# Patient Record
Sex: MALE | Race: WHITE | Employment: FULL TIME | URBAN - METROPOLITAN AREA
[De-identification: names, ages, dates, MRNs, and addresses within clinical notes are randomized per-mention and may not be internally consistent; named-entity substitution may affect disease eponyms.]

---

## 2018-10-01 NOTE — PROGRESS NOTES
"  SUBJECTIVE:                                                    Justin Kent is a 43 year old male who presents to clinic today for the following health issues:    WART(S)  Onset: 2-3 months    Description:   Location: left foot   Number of warts: 1  Painful: no     Accompanying Signs & Symptoms:  Signs of infection: no     History:   History of trauma: no   Prior warts: YES- when 9     Therapies Tried and outcome: liquid nitrogen,oregano oil      Problem list and histories reviewed & adjusted, as indicated.  Additional history: as documented    Patient Active Problem List   Diagnosis     Dyshidrosis     Past Surgical History:   Procedure Laterality Date     C LASIK (EMP) W OPT MYOPIA -3.12  2001       Social History   Substance Use Topics     Smoking status: Never Smoker     Smokeless tobacco: Never Used     Alcohol use Yes      Comment: 2 drinks per day     Family History   Problem Relation Age of Onset     Prostate Cancer Father      C.A.D. Maternal Grandfather      Hypertension Maternal Grandfather      Asthma No family hx of      Diabetes No family hx of      Cerebrovascular Disease No family hx of      Breast Cancer No family hx of      Cancer - colorectal No family hx of      Anesthesia Reaction No family hx of      Blood Disease No family hx of      Eye Disorder No family hx of      Osteoporosis No family hx of      Thyroid Disease No family hx of          No current outpatient prescriptions on file.     No Known Allergies    ROS:  Constitutional, HEENT, cardiovascular, pulmonary, GI, , musculoskeletal, neuro, skin, endocrine and psych systems are negative, except as otherwise noted.    OBJECTIVE:     /88  Pulse 70  Temp 98  F (36.7  C) (Oral)  Resp 14  Ht 5' 8\" (1.727 m)  Wt 197 lb (89.4 kg)  SpO2 98%  BMI 29.95 kg/m2  Body mass index is 29.95 kg/(m^2).  GENERAL: healthy, alert and no distress  SKIN: left foot: 1 plantar wart        ASSESSMENT/PLAN:       1. Plantar wart of left " foot  PLAN:    Each wart was frozen easily three times with liquid nitrogen.  A total of 1 wart was treated today.    The etiology of common warts were discussed.    The patient will continue to use the over-the-counter medications such as Compound W on a nightly basis.  Warm soapy water soaks and sanding also recommended.    The patient is to return every two weeks until all warts have resolved.    - DESTRUCT BENIGN LESION, UP TO 14      Kristen M. Kehr, PA-C  [facility]

## 2018-10-02 ENCOUNTER — OFFICE VISIT (OUTPATIENT)
Dept: FAMILY MEDICINE | Facility: CLINIC | Age: 43
End: 2018-10-02
Payer: COMMERCIAL

## 2018-10-02 VITALS
DIASTOLIC BLOOD PRESSURE: 88 MMHG | WEIGHT: 197 LBS | SYSTOLIC BLOOD PRESSURE: 134 MMHG | BODY MASS INDEX: 29.86 KG/M2 | OXYGEN SATURATION: 98 % | HEART RATE: 70 BPM | HEIGHT: 68 IN | RESPIRATION RATE: 14 BRPM | TEMPERATURE: 98 F

## 2018-10-02 DIAGNOSIS — B07.0 PLANTAR WART OF LEFT FOOT: Primary | ICD-10-CM

## 2018-10-02 PROCEDURE — 17110 DESTRUCTION B9 LES UP TO 14: CPT | Performed by: PHYSICIAN ASSISTANT

## 2018-10-02 PROCEDURE — 99207 ZZC NO CHARGE LOS: CPT | Performed by: PHYSICIAN ASSISTANT

## 2018-10-02 NOTE — MR AVS SNAPSHOT
"              After Visit Summary   10/2/2018    Justin Kent    MRN: [id number]           Patient Information     Date Of Birth          1975        Visit Information        Provider Department      10/2/2018 11:00 AM Kehr, Kristen M, PA-C [facility]        Today's Diagnoses     Screening for HIV (human immunodeficiency virus)        Need for prophylactic vaccination and inoculation against influenza        Need for prophylactic vaccination with tetanus-diphtheria (Td)           Follow-ups after your visit        Follow-up notes from your care team     Return in about 2 weeks (around 10/16/2018) for FOR WART TREATMENT IF NEEDED.      Who to contact     If you have questions or need follow up information about today's clinic visit or your schedule please contact [facility] directly at [phone].  Normal or non-critical lab and imaging results will be communicated to you by MyChart, letter or phone within 4 business days after the clinic has received the results. If you do not hear from us within 7 days, please contact the clinic through MyChart or phone. If you have a critical or abnormal lab result, we will notify you by phone as soon as possible.  Submit refill requests through [employer] or call your pharmacy and they will forward the refill request to us. Please allow 3 business days for your refill to be completed.          Additional Information About Your Visit        Care EveryWhere ID     This is your Care EveryWhere ID. This could be used by other organizations to access your [place] medical records  [id number]        Your Vitals Were     Pulse Temperature Respirations Height Pulse Oximetry BMI (Body Mass Index)    70 98  F (36.7  C) (Oral) 14 5' 8\" (1.727 m) 98% 29.95 kg/m2       Blood Pressure from Last 3 Encounters:   10/02/18 134/88   05/06/13 (!) 120/95   04/12/13 137/90    Weight from Last 3 Encounters:   10/02/18 197 lb (89.4 kg)   05/06/13 210 lb (95.3 kg) "   04/12/13 211 lb (95.7 kg)              Today, you had the following     No orders found for display       Primary Care Provider Office Phone # Fax #    [facility] [phone] [id number] [facility] [address]        Equal Access to Services     MEDHAT WOOD : Hadii aad ku hadasho Soomaali, waaxda luqadaha, qaybta kaalmada adeegyada, waxay mattin hayaan jenna constantinoarelibernie laernestine . So [facility] [phone].    ATENCIÓN: Si habla español, tiene a bartholomew disposición servicios gratuitos de asistencia lingüística. Yuliana al [phone].    We comply with applicable federal civil rights laws and [place] laws. We do not discriminate on the basis of race, color, national origin, age, disability, sex, sexual orientation, or gender identity.            Thank you!     Thank you for choosing [facility]  for your care. Our goal is always to provide you with excellent care. Hearing back from our patients is one way we can continue to improve our services. Please take a few minutes to complete the written survey that you may receive in the mail after your visit with us. Thank you!             Your Updated Medication List - Protect others around you: Learn how to safely use, store and throw away your medicines at www.disposemymeds.org.      Notice  As of 10/2/2018 11:42 AM    You have not been prescribed any medications.

## 2018-11-20 ENCOUNTER — OFFICE VISIT (OUTPATIENT)
Dept: FAMILY MEDICINE | Facility: CLINIC | Age: 43
End: 2018-11-20
Payer: COMMERCIAL

## 2018-11-20 VITALS
RESPIRATION RATE: 14 BRPM | OXYGEN SATURATION: 98 % | WEIGHT: 197 LBS | BODY MASS INDEX: 29.86 KG/M2 | SYSTOLIC BLOOD PRESSURE: 132 MMHG | HEART RATE: 59 BPM | HEIGHT: 68 IN | DIASTOLIC BLOOD PRESSURE: 86 MMHG | TEMPERATURE: 98.2 F

## 2018-11-20 DIAGNOSIS — B07.0 PLANTAR WARTS: Primary | ICD-10-CM

## 2018-11-20 PROCEDURE — 17110 DESTRUCTION B9 LES UP TO 14: CPT | Performed by: PHYSICIAN ASSISTANT

## 2018-11-20 NOTE — MR AVS SNAPSHOT
"              After Visit Summary   11/20/2018    Justin Kent    MRN: [id number]           Patient Information     Date Of Birth          1975        Visit Information        Provider Department      11/20/2018 8:40 AM Felix Saldaña PA-C [facility]        Today's Diagnoses     Plantar warts    -  1       Follow-ups after your visit        Who to contact     If you have questions or need follow up information about today's clinic visit or your schedule please contact [facility] directly at [phone].  Normal or non-critical lab and imaging results will be communicated to you by MyChart, letter or phone within 4 business days after the clinic has received the results. If you do not hear from us within 7 days, please contact the clinic through MyChart or phone. If you have a critical or abnormal lab result, we will notify you by phone as soon as possible.  Submit refill requests through [employer] or call your pharmacy and they will forward the refill request to us. Please allow 3 business days for your refill to be completed.          Additional Information About Your Visit        Care EveryWhere ID     This is your Care EveryWhere ID. This could be used by other organizations to access your [place] medical records  [id number]        Your Vitals Were     Pulse Temperature Respirations Height Pulse Oximetry BMI (Body Mass Index)    59 98.2  F (36.8  C) (Oral) 14 5' 8\" (1.727 m) 98% 29.95 kg/m2       Blood Pressure from Last 3 Encounters:   11/20/18 132/86   10/02/18 134/88   05/06/13 (!) 120/95    Weight from Last 3 Encounters:   11/20/18 197 lb (89.4 kg)   10/02/18 197 lb (89.4 kg)   05/06/13 210 lb (95.3 kg)              We Performed the Following     DESTRUCT BENIGN LESION, UP TO 14        Primary Care Provider Office Phone # Fax #    [facility] [phone] [address] [facility] [address]        Equal Access to Services     " MEDHAT WOOD : Hadii aad krista mercedes Castillo, warjda luqadaha, qaybta kaalmada jennamacielbrigid, eder constantinoarelibernie spencer. So [facility] [phone].    ATENCIÓN: Si habla español, tiene a bartholomew disposición servicios gratuitos de asistencia lingüística. Llame al [phone].    We comply with applicable federal civil rights laws and [place] laws. We do not discriminate on the basis of race, color, national origin, age, disability, sex, sexual orientation, or gender identity.            Thank you!     [facility] you [facility]  [facility]. Our goal is always to provide you with excellent care. Hearing back from our patients is one way we can continue to improve our services. Please take a few minutes to complete the written survey that you may receive in the mail after your visit with us. Thank you!             Your Updated Medication List - Protect others around you: Learn how to safely use, store and throw away your medicines at www.disposemymeds.org.      Notice  As of 11/20/2018  9:36 AM    You have not been prescribed any medications.

## 2018-11-20 NOTE — PROGRESS NOTES
"SUBJECTIVE:  Justin is here for follow up of warts which were treated with liquid nitrogen (3 cycles with full thaw between).    OBJECTIVE:   /86  Pulse 59  Temp 98.2  F (36.8  C) (Oral)  Resp 14  Ht 5' 8\" (1.727 m)  Wt 197 lb (89.4 kg)  SpO2 98%  BMI 29.95 kg/m2  Exam discloses verrucae in the same areas as last visit decreased in size and thickness. Bottom of left foot    ASSESSMENT:  Warts- improved, but not yet resolved.    PLAN:   The patient/parents elect to proceed with liquid nitrogen (3 cycles with full thaw between) and cantharadin for 10 hours after paring down the lesions with a sterile scalpel blade.    The patient was warned regarding pain, skin necrosis, bullae, and need for re-treatment.  Follow up  2 weeks    Felix Saldaña PA-C  "

## 2018-11-20 NOTE — NURSING NOTE
"Chief Complaint   Patient presents with     [place]     [facility] Maintenance     flu, phq2       Initial /86  Pulse 59  Temp 98.2  F (36.8  C) (Oral)  Resp 14  Ht 5' 8\" (1.727 m)  Wt 197 lb (89.4 kg)  SpO2 98%  BMI 29.95 kg/m2 Estimated body mass index is 29.95 kg/(m^2) as calculated from the following:    Height as of this encounter: 5' 8\" (1.727 m).    Weight as of this encounter: 197 lb (89.4 kg).  Medication Reconciliation: complete  Charlene Murillo CMA    "

## 2019-11-04 ENCOUNTER — HEALTH MAINTENANCE LETTER (OUTPATIENT)
Age: 44
End: 2019-11-04

## 2020-11-16 ENCOUNTER — HEALTH MAINTENANCE LETTER (OUTPATIENT)
Age: 45
End: 2020-11-16

## 2021-09-18 ENCOUNTER — HEALTH MAINTENANCE LETTER (OUTPATIENT)
Age: 46
End: 2021-09-18

## 2022-01-08 ENCOUNTER — HEALTH MAINTENANCE LETTER (OUTPATIENT)
Age: 47
End: 2022-01-08

## 2022-11-20 ENCOUNTER — HEALTH MAINTENANCE LETTER (OUTPATIENT)
Age: 47
End: 2022-11-20

## 2023-04-15 ENCOUNTER — HEALTH MAINTENANCE LETTER (OUTPATIENT)
Age: 48
End: 2023-04-15